# Patient Record
(demographics unavailable — no encounter records)

---

## 2025-01-16 NOTE — PHYSICAL EXAM
[de-identified] : CONSTITUTIONAL: Patient is a very pleasant individual who is well-nourished and appears stated age. PSYCHIATRIC: Alert and oriented times three and in no apparent distress, and participates with orthopedic evaluation well. HEAD: Atraumatic and nonsyndromic in appearance. EENT: No thyromegaly, EOMI. RESPIRATORY: Respiratory rate is regular, not dyspneic on examination. LYMPHATICS: There is no cervical or axillary lymphadenopathy. INTEGUMENTARY: Skin is clean, dry, and intact about the bilateral upper extremities and cervical spine. VASCULAR: There is brisk capillary refill about the bilateral upper extremities and radial pulses are 2/4.  Cervical Spine Exam:  Significant tenderness left-sided paraspinal cervical musculature left posterior scapula There is also shoulder impingement signs of shoulder abduction external rotation Shoulder Abduction (C5): 5/5 B/L Biceps (C6): 5/5 B/L Wrist Extension (C6): 5/5 B/L Triceps (C7): 5/5 B/L Wrist Flexion (C7): 5/5 B/L Finger Adduction/Abduction (C8/T1): 5/5 B/L Sensation:  SILT C5-T1 bilateral  Reflexes: 2+ reflexes Bicep/Tricep/Quadriceps/Achilles  Gait: Normal gait w/o assistance Able to perform tandem gait Able to Heel Walk Able to Toe Walk  Special Testing:  Positive Spurlings recreating neck pain  negative clonus BL LE negative Hoffmans B/L UE   [de-identified] : Upright AP, lateral, and flexion/extension radiographs of the cervical spine performed on 1/16/2025 in the Radiology Department at [facility] for the indication of neck pain are reviewed.  These studies demonstrate maintenance of cervical lordosis there is moderate to severe spondylosis disc height loss at C5-C6 the rest of the disc heights are well-maintained.

## 2025-01-16 NOTE — DISCUSSION/SUMMARY
[de-identified] : 51-year-old female with cervical Spondyloarthritis and left upper extremity radiculopathy likely C6  I discussed with Ashley that I believe her symptoms are related to her cervical Spondyloarthritis which is causing a left upper extremity radiculopathy.  I also discussed the possibility of the be relating to a shoulder pathology possible subacromial bursitis either way I discussed that this should improve with time and conservative measures Medrol Dosepak Gabapentin 300 mg at nighttime Will give her a note to stay off from work tomorrow I will see her back in 2 weeks

## 2025-01-16 NOTE — HISTORY OF PRESENT ILLNESS
[de-identified] : Chief Complaint: Neck and left arm pain   History of Present Illness: 51-year-old female presenting today for evaluation of her severe neck and left arm pain.  States this started about 2 weeks ago came on insidiously Cicely the left side of the neck paraspinal cervical musculature radiating to the left shoulder blade left scapula and sometimes down the lateral arm.  She states that it worsened over the past week she was moving some boxes.  She is never had this before.  She has severe discomfort specially at nighttime while sleeping.  She also has significant radiating pain from the neck and left scapula.  She tried Motrin and Aleve with limited relief of her symptoms.  She denies any numbness or tingling.   Past medical history, past surgical history, medications, allergies, social history, and family history are as documented in our records today.  Notable items include: None   Review of Systems: I have reviewed the patient's documented Review of Systems data today, I concur with this documentation.

## 2025-02-06 NOTE — PHYSICAL EXAM
"REFERRAL REQUESTED BY: Dr. Ramesh Rothman, PCP   LAST SEEN: 10/9/23    Patient ID: Patricia Mckeon is a 56 y.o. female     HPI:   Here for follow up left calf ulcer. Last seen in October. Reports issues with transportation etc. Seems to have more swelling leonel. The tubigrip have not been replaced since last seen. Wound is a lot larger than last seen - reports this is \"looking better\" per the [facility] nurse.     CURRENT DRESSING:  Who is performing the dressing change: [facility]  Dressing applied: left lat distal leg ulcer- triple helix powder, hydrofera blue, mepilex; left lat prox leg ulcer (NEW)- alginate, bordered gauze; left ant leg ulcer- alginate, ABD  Dressing Frequency: Monday/Wednesday/Friday    EDEMA MANAGEMENT:  Compression:  Right: Double Tubigrip CALF 60CM   Left: Double Tubigrip   CALF 90CM     Other Modality  Sleeping in Bed: yes  Elevating FOB:  yes    Offloading/Pressure Relief  Activity Level:  ad bradly  Protection Devices:  N/A    Offloading/Pressure Relief Effective?     ROS:  No fevers or chills  No interval admissions.  [facility] continues to help with the dressing changes and compression    Objective     VITALS:  BP (!) 165/93 (BP Location: Left arm, Patient Position: Sitting)   Pulse 66   Resp 18   Ht 1.575 m (5' 2\")   Wt (!) 176 kg (388 lb 3.2 oz)   BMI 71.00 kg/m²       Physical Exam    ARRIVAL:  Walker  TRANSFER:  None    PSYCHIATRIC:  Oriented to person, place and time: A & O x 3    CONSTITUTIONAL:    Appearance:  Well Groomed and Well Nourished    SKIN:   LDS and scarring, mild eruthema     PULSES:     EXTREMITY TEMPERATURE:    RIGHT: normal  LEFT: normal    EDEMA: severe L>>R    WOUND ASSESSMENT:    Wound 05/23/22 Venous Ulcer Leg Left;Lateral (Active)   Date First Assessed: 05/23/22   Present on Original Admission: Yes  Primary Wound Type: Venous Ulcer  Location: Leg  Wound Location Orientation: (c) Left;Lateral      Assessments 9/13/2023  3:20 PM 1/2/2024 11:05 AM   Wound Image      Site Assessment " Fibrinous --   Cindi-Wound Assessment Intact;Erythematous --   Non-staged Wound Description Full thickness --   Wound Length (cm) 2.1 cm --   Wound Width (cm) 4.2 cm --   Wound Surface Area (cm^2) 8.82 cm^2 --   Wound Depth (cm) 0.4 cm --   Wound Volume (cm^3) 3.528 cm^3 --   Drainage Description Serosanguineous --   Drainage Amount Small --       Inactive Orders   Date Order Priority Status Authorizing Provider   01/02/24 1056 Debridement Routine Completed Adelita Martin MD   10/09/23 1135 Debridement Routine Completed Adelita Martin MD       Wound 05/23/22 Venous Ulcer Leg Left;Anterior;Lateral (Active)   Date First Assessed: 05/23/22   Present on Original Admission: Yes  Primary Wound Type: Venous Ulcer  Location: (c) Leg  Wound Location Orientation: Left;Anterior;Lateral      Assessments 9/13/2023  3:23 PM 1/2/2024 11:04 AM   Wound Image      Site Assessment Granulation --   Cindi-Wound Assessment Erythematous --   Non-staged Wound Description Full thickness --       Inactive Orders   Date Order Priority Status Authorizing Provider   01/02/24 1059 Debridement Routine Completed Adelita Martin MD   10/09/23 1136 Debridement Routine Completed Adelita Martin MD       Wound 01/02/24 Venous Ulcer Leg Left;Proximal;Lateral (Active)   Date First Assessed: 01/02/24   Primary Wound Type: (c) Venous Ulcer  Location: Leg  Wound Location Orientation: Left;Proximal;Lateral      Assessments 1/2/2024 10:05 AM 1/2/2024 11:04 AM   Wound Image       Site Assessment Fibrinous;Granulation;Pink;Red --   Cindi-Wound Assessment Erythematous;Intact --   Non-staged Wound Description Full thickness --   Wound Length (cm) 3 cm --   Wound Width (cm) 4.5 cm --   Wound Surface Area (cm^2) 13.5 cm^2 --   Wound Depth (cm) 0.1 cm --   Wound Volume (cm^3) 1.35 cm^3 --   Drainage Description Serosanguineous --   Drainage Amount Large --   Dressing Status Removed --       Inactive Orders   Date Order Priority Status Authorizing Provider    01/02/24 1101 Debridement Routine Completed Adelita Martin MD       Wound 01/02/24 Venous Ulcer Leg Left;Medial (Active)   Date First Assessed: 01/02/24   Primary Wound Type: Venous Ulcer  Location: Leg  Wound Location Orientation: Left;Medial      Assessments 1/2/2024 11:05 AM   Site Assessment Pink;Red;Fibrinous   Cindi-Wound Assessment Intact   Non-staged Wound Description Full thickness   Wound Length (cm) 2 cm   Wound Width (cm) 1 cm   Wound Surface Area (cm^2) 2 cm^2   Wound Depth (cm) 0.6 cm   Wound Volume (cm^3) 1.2 cm^3   Wound Bed Granulation (%) 90 %   Drainage Description Serosanguineous   Drainage Amount Moderate       No associated orders.       Debridement   Wound 05/23/22 Other (comment) Pretibial Left;Lateral    Consent obtained? verbal  Consent given by: patient  Performed by: resident and physician  Debridement type: surgical  Level of debridement: subcutaneous tissue  Pain control: lidocaine 2%  Post-debridement measurements  Length (cm): 6  Width (cm): 4  Depth (cm): 0.9  Percent debrided: 100%  Surface Area (cm^2): 24  Area debrided (cm^2): 24  Volume (cm^3): 21.6  Tissue and other material debrided: subcutaneous tissue  Devitalized tissue debrided: fibrin  Instrument(s) utilized: nippers and curette  Bleeding: small  Hemostasis obtained with: not applicable  Response to treatment: procedure was tolerated well    Debridement   Wound 05/23/22 Venous Ulcer Leg Left;Anterior;Lateral    Consent obtained? verbal  Consent given by: patient  Performed by: physician and resident  Debridement type: surgical  Level of debridement: subcutaneous tissue  Pain control: lidocaine 2%  Post-debridement measurements  Length (cm): 1  Width (cm): 3.5  Depth (cm): 0.4  Percent debrided: 100%  Surface Area (cm^2): 3.5  Area debrided (cm^2): 3.5  Volume (cm^3): 1.4  Tissue and other material debrided: subcutaneous tissue  Devitalized tissue debrided: fibrin  Instrument(s) utilized: curette  Bleeding:  Normal vision: sees adequately in most situations; can see medication labels, newsprint small  Hemostasis obtained with: not applicable  Response to treatment: procedure was tolerated well    Debridement   Wound 01/02/24 Venous Ulcer Leg Left;Proximal;Lateral    Consent obtained? verbal  Consent given by: patient  Performed by: resident  Debridement type: surgical  Level of debridement: subcutaneous tissue  Pain control: lidocaine 2%  Post-debridement measurements  Length (cm): 3  Width (cm): 5  Depth (cm): 0.2  Percent debrided: 100%  Surface Area (cm^2): 15  Area debrided (cm^2): 15  Volume (cm^3): 3  Tissue and other material debrided: subcutaneous tissue  Devitalized tissue debrided: fibrin  Instrument(s) utilized: curette  Bleeding: small  Hemostasis obtained with: not applicable  Response to treatment: procedure was tolerated well    Debridement    Consent obtained? verbal  Consent given by: patient  Performed by: physician and resident  Debridement type: surgical  Level of debridement: subcutaneous tissue  Pain control: lidocaine 2%  Post-debridement measurements  Length (cm): 2  Width (cm): 1  Depth (cm): 0.6  Percent debrided: 100%  Surface Area (cm^2): 2  Area debrided (cm^2): 2  Volume (cm^3): 1.2  Tissue and other material debrided: subcutaneous tissue  Devitalized tissue debrided: fibrin  Instrument(s) utilized: curette  Bleeding: small  Hemostasis obtained with: not applicable  Response to treatment: procedure was tolerated well        Assessment/Plan   Ulcers larger since last seen. Change dressing as noted. Compression outdated - renew tubigrip. Likely needs better compression. Elevate at night and at rest. Follow up 3 weeks. Continue with [facility].     VISIT ORDERS:  Vascular Study Required: n  Labs Required: n  Radiology Imaging Required: n  Consultation Required: n  Rx Provided:   Changes to Plan of Care: y      NEW ORDERS:  Wash: CHG  Irrigate/Soak:  Skin Care: A&D  Dressing:  triple helix and mepilex change MWF  Compression: double tubigrip leonel  Offloading: keep pressure off the  ulcers    DISCHARGE PLANNING:    Follow Up: 3 week  Nurse Visit: prn    General Instructions:  Center RN to apply EMLA/Lidocaine 1% jelly/LMX  topically to wound(s) prior to debridement by physician.  Center RN my see patient as a nurse consult in my absence.      RN Post Procedure Note:    LLE washed with warm soapy CHG 4%, rinsed, and patted dry. A&D to the intact skin, Triple helix Patch to the base of the wounds covered with a mepilex. Double tubigrip to BLE for compression. Pt educated on the importance of elevating and compression. Pt tolerated procedure well.  ~SALAS Parra RN/ECF/Assisted Living  Agency/Facility: [facility]  days/week: Monday/Wednesday/Friday  Contacted Regarding Changes: yes       [Chaperone Present] : A chaperone was present in the examining room during all aspects of the physical examination [39201] : A chaperone was present during the pelvic exam. [FreeTextEntry2] : Darlin [Appropriately responsive] : appropriately responsive [Alert] : alert [No Acute Distress] : no acute distress [No Lymphadenopathy] : no lymphadenopathy [Regular Rate Rhythm] : regular rate rhythm [No Murmurs] : no murmurs [Clear to Auscultation B/L] : clear to auscultation bilaterally [Soft] : soft [Non-tender] : non-tender [Non-distended] : non-distended [No HSM] : No HSM [No Lesions] : no lesions [No Mass] : no mass [Oriented x3] : oriented x3 [Examination Of The Breasts] : a normal appearance [No Masses] : no breast masses were palpable [Vulvar Atrophy] : vulvar atrophy [Labia Majora] : normal [Labia Minora] : normal [Atrophy] : atrophy [Normal] : normal [Uterine Adnexae] : normal [Declined] : Patient declined rectal exam

## 2025-02-06 NOTE — HISTORY OF PRESENT ILLNESS
[N] : Patient is not sexually active [Y] : Positive pregnancy history [Menarche Age: ____] : age at menarche was [unfilled] [FreeTextEntry1] : 51-year-old -0-0-1 last menstrual cycle was 2 years ago presents with recent postcoital bleeding and occasional hot flashes.  She complains of dyspareunia. [PGxTotal] : 1 [[facility] Center[facility]] : 1 [PGHxPremature] : 0 [PGHxAbortions] : 0 [[facility] Centeriving] : 1 [PGHxABInduced] : 0 [PGHxABSpont] : 0 [PGHxEctopic] : 0 [PGHxMultBirths] : 0

## 2025-02-06 NOTE — HISTORY OF PRESENT ILLNESS
[de-identified] : CC: Neck pain and left arm pain  51-year-old female presenting today for her 3 follow-up visit.  She is having severe neck left upper extremity C6 radiculopathy we tried a course of a Medrol Dosepak anti-inflammatories and gabapentin she states that her symptoms have completely resolved she is happy with her current progress she has limited to no discomfort now she is currently not taking any pain medications.

## 2025-02-06 NOTE — DISCUSSION/SUMMARY
[FreeTextEntry1] : 51-year-old -0-0-1 presents with dyspareunia and postcoital bleeding.  Occasional hot flashes.  Physical examination shows evidence of atrophic changes.  Pap GC chlamydia sent and pelvic sonogram ordered.  Estrogen cream prescribed.  Return in 4 weeks for hysteroscopy endometrial sampling because of the postcoital bleeding.  Patient exercises and eats well.

## 2025-03-10 NOTE — PROCEDURE
[Hysteroscopy] : Hysteroscopy [Time out performed] : Pre-procedure time out performed.  Patient's name, date of birth and procedure confirmed. [Consent Obtained] : Consent obtained [Postmenopausal bleeding] : postmenopausal bleeding [Risks] : risks [Benefits] : benefits [Alternatives] : alternatives [Patient] : patient [Infection] : infection [Bleeding] : bleeding [Allergic Reaction] : allergic reaction [Lidocaine___ mL] : [unfilled] ~UmL of lidocaine [flexible] : Using aseptic technique a hysteroscopy was performed using a flexible hysteroscope [Sent to Pathology] : specimen was placed in buffered formalin and sent for pathology [Hemostasis obtained] : hemostasis obtained [Tolerated Well] : Patient tolerated the procedure well [Aftercare instructions/regstrictions given and follow-up scheduled] : Aftercare instructions/restrictions given and follow-up scheduled [Antibiotics given] : antibiotics not given [de-identified] : Under sterile condition and with paracervical block the cervix was dilated and endometrial sampling obtained and sent to pathology.  Hysteroscopic evaluation shows atrophic changes.  No polyps or myomas noted.  Patient tolerated the procedure well.

## 2025-03-11 NOTE — END OF VISIT
[Time Spent: ___ minutes] : I have spent [unfilled] minutes of time on the encounter which excludes teaching and separately reported services. [TextEntry] : Discussed with pt at length regarding Prior Covid 19 infection, soboe, weight issues, ? asthma; reviewed prior w/u with pt as above.

## 2025-03-11 NOTE — CONSULT LETTER
[Dear  ___] : Dear  [unfilled], [Consult Letter:] : I had the pleasure of evaluating your patient, [unfilled]. [Please see my note below.] : Please see my note below. [Consult Closing:] : Thank you very much for allowing me to participate in the care of this patient.  If you have any questions, please do not hesitate to contact me. [Sincerely,] : Sincerely, [FreeTextEntry3] : Matthew Lopez MD, FCCP, D. ABSM\par  Pulmonary and Sleep Medicine\par  [facility] at [place]

## 2025-03-11 NOTE — RESULTS/DATA
[TextEntry] : CXR from 12/5/19 was clear by report. Covid Ab + from 8/20/20. A1AT level from 9/19/20 done by GI was normal at 120. CXR from 11/6/20 was clear. Home PSG from 12/5/20 revealed no ANGELINA with an AHI of 3.6. PSG from 1/23/21 revealed no ANGELINA with an AHI of 4.1.

## 2025-03-11 NOTE — DISCUSSION/SUMMARY
[FreeTextEntry1] : #1. PFTs performed previously were essentially normal; repeat again normal; repeat in winter when her symptoms are typically worse were still normal though slightly reduced from previous; repeat ari again normal despite URI symptoms. #2. The patient does not appear to require chronic BD therapy at this time; continue to observe off of Advair/Dulera which she only uses when symptoms worsen otherwise with occasional Albuterol use. #3. SOBOE is likely related to weight or deconditioning given normal PFTs. #4. Diet and exercise for weight loss. #5. Both HST and in-lab PSG were negative for ANGELINA with an in-lab AHI of 4.1 #6. Pt could f/u as needed as PFTs were normal and sleep w/u was without ANGELINA; pt would like to continue to monitor her intermittent asthma so she will return at least annually with lung function testing. #9. Pt had both Covid vaccines and booster. #10. Medrol dose renetta for URI symptoms and can use as needed. Sometimes requires abx as well. #11. F/u in 6 months with ari but sooner if needed.  The patient expressed understanding and agreement with the above recommendations/plan and accepts responsibility to be compliant with recommended testing, therapies, and f/u visits. All relevant questions and concerns were addressed.

## 2025-03-11 NOTE — HISTORY OF PRESENT ILLNESS
[Excessive Daytime Sleepiness] : excessive daytime sleepiness [Snoring] : snoring [Unrefreshing Sleep] : unrefreshing sleep [Sleepy When Sedentary] : sleepy when sedentary [Excess Weight] : excess weight [Currently Experiencing] : The patient is currently experiencing symptoms. [Dyspnea] : dyspnea [Back Pain] : back pain [Low Calorie Diet] : low calorie diet [Fair Compliance] : fair compliance with treatment [Fair Tolerance] : fair tolerance of treatment [Poor Symptom Control] : poor symptom control [Dyslipidemia] : dyslipidemia [High] : high [Low Calorie] : low calorie [Well Balanced Diet] : well balanced meals [Follow-Up - Routine Clinic] : a routine clinic follow-up of [Intermittent] : intermittent [None] : No associated symptoms are reported [Inhaled Short-Acting Beta-2 Agonists] : inhaled short-acting beta-2 agonists [Good Compliance] : good compliance with treatment [Good Tolerance] : good tolerance of treatment [Good Symptom Control] : good symptom control [TextBox_4] : Former social smoker x 7 years, quit 2009. Pt went to [facility] for asthma exac in 12/5/20 and was started on Advair inhaler but now is only on Albuterol as needed. She reports that her symptoms are back to baseline. Pt was dx'd with Covid 19 infection in April 2020 and subsequent Covid Ab test was +. She presented with "flu like" symptoms, loss of smell, sweats, fevers, weakness/fatigue. She denies any therapy at that time and recovered after 2 weeks. She is no longer on Advair and reports only occasional prn Albuterol use. Pt with frequent URIs but now at baseline and only on Albuterol as needed which she is rarely using. She uses abx and Medrol dose renetta on occasion but usually does not require chronic therapy. Pt trying to lose weight. Essentially at baseline. [Witnessed Apnea During Sleep] : no witnessed apnea during sleep [Witnessed Gasping During Sleep] : no witnessed gasping during sleep [Knee Pain] : no knee pain [Joint Pain] : no joint pain

## 2025-03-11 NOTE — PROCEDURE
[FreeTextEntry1] : PFTs 2/27/20 - essentially normal. PFTs 1/5/21 - normal. PFTs 3/8/22 - normal and improved from previous. PFTs 1/27/23 - normal though slightly worse from previous though spirometry still > 100%. Leming 9/23/24 - normal and at baseline.

## 2025-03-19 NOTE — HISTORY OF PRESENT ILLNESS
[FreeTextEntry1] : Taking over care for Dr. Jones and Dr. Varghese due to patient preference. Distance to Dr. Varghese office and dr. jones has left [facility] practice. [facility] preventive cardiology  50 year old female with a past medical history of Asthma, hypothyroid, elevated transaminase level, VERY SIGNIFICANT FHx Early MI, NAFLD, hypertriglyceridemia, hypercholesterolemia, statin intolerance   9/2021 Labs on 9/2021 demonstrated: Total Cholesterol 287mg/dL HDL 39mg/dL  LDL 195mg/dL Triglycerides 270mg/dL   Father - MI x2 around 60 Brother passed of stroke at 40 both parents and sister have high chol Crestor - stopped due to increases liver enzyme about 1 month ago,  Was also rechallenged on crestor (3x per week) due to myalgias - but myalgias continued  exercise: hurt knee and unable to exercise at the moment - does walk, dance and ride bike when she is feeling healthy Lost 20lbs  Diet: does not eat carbs, consumes a lot of veggies, fruits, chicken and fish    Doing much better on PCSK9i TG nl 102 and LDL &&   Worried about glucose going upwith Repatha But A1c not actually going up. Watching it.   Having migraines. Suggested holding off on Repatha for 6 weeks  If still there when re-starting, then go to bempedoic acid   6-2023 Doing well - back on Repatha  Excellent result   Repatha doing well A1c climbing  Referred to nutritionist   New CP - sending for CTA Cors  CTA - Cors -  7-2024 - AU -0  TTE --  - LVEF  Nl Maybe vasospasm - but BP low  9/27/24: Taking over for Dr. Varghese for patient preference given location. Used to follow with dr. jones who has left the practice. Denies chest pain/pressure, palpitations, irregular and/or rapid heart beat, SOB, REYES, syncope/near syncope, dizziness, orthopnea, PND, cough, edema, f/c, n/v/d, hematuria, or hematochezia. no concerns, feels well.  03/2025: returns for follow up. LDL 59,  reports she will start working out more. refill of repatha requested. hba1c 5.9%

## 2025-03-19 NOTE — ASSESSMENT
[FreeTextEntry1] : Taking over care for Dr. Jones and Dr. Varghese due to patient preference. Distance to Dr. Varghese office and dr. jones has left [facility] practice. [facility] cardiology  50 year old female with a past medical history of Asthma, hypothyroid, elevated transaminase level, VERY SIGNIFICANT FHx Early MI, NAFLD, hypertriglyceridemia, hypercholesterolemia, statin intolerance

## 2025-03-19 NOTE — DISCUSSION/SUMMARY
[FreeTextEntry1] : Labs on 9/2021 demonstrated: Total Cholesterol 287mg/dL HDL 39mg/dL LDL 195mg/dL Triglycerides 270mg/dL   VERY SIGNIFICANT FHx Early MI, NAFLD, hypertriglyceridemia, hypercholesterolemia, statin intolerance  Father - MI x2 around 60, Elevated transaminase level with statins  CTA - Cardiac 7-2024. CAC 0 no epicardial disease obstructive TTE  - LVEF within normal limits Type IIb Hyperlipdemia  Plan: - PCSK9i continue (Monitor glucose) - Will f/u in 6 months  LDL 03/2025: LDL 56, ,  - c/w pcsk9i, start omega 3's, no fatty/high carb/fat foods, avoid alcohol, increase exercise. - repeat hba1c in 3 months May-june 2025. If continues to elevate despite activity and weight loss will consider change to Leqvio.  The patient is aware of alarm cardiac type symptoms, will call with questions or concerns and is aware to activate 911 and/or present to the closest emergency department if concerns.  As with all my patients, I would strongly pursue preventative cardiology, lifestyle modifications which are necessary for long-term cardiovascular health.  The following is recommended: Advised a plant based, limited salt, low fat, minimal dairy diet, stress reduction/psychosomatic management, sleep hygiene/ANGELINA testing and healthy weight loss, annual influenza vaccine, medication compliance, high risk behavior mitigation (I.e. tobacco abstinence, alcohol abstinence, recreational/illicit drug use abstinence), increased exercise activity as per AHA/ACC standard for long term cardiovascular risk reduction.

## 2025-03-25 NOTE — HISTORY OF PRESENT ILLNESS
[FreeTextEntry1] : DAIVD CAMPA is a 51 year old female with a PMH significant for fatty liver, HLD, Asthma, and hypothyroid.  March 25, 2025 Patient presents for 1 year follow up of elevated LFTs and fatty liver  No new liver related symptoms reported Abd US 2/22/2025 fatty liver. No HCC AST 27 ALT 28 Alk Phos 93  No recent infections or hospitalizations  No new medications   March 19, 2024 Records reviewed, including notes, labs, imaging, etc. No new liver-related complaints like hematemesis melena jaundice. No hospitalizations or medication changes. Previsit labs reviewed with the patient ALT still mildly elevated.  She has not been able to lose weight but has put on more weight. Counseled about the need to lose weight   9/19/23: Pt presents today for evaluation of recent rash that started in August. Pt developed rash spontaneously in August and presented to the ER. She was given benadryl and prednisone and then followed up w/allergist. Pt takes multiple otc and herbal supplements which she was advised to stop and slowly re-add to her regimen while evaluating for a reaction.   3/1/23: She presents today for follow up of fatty liver. Since last visit, pt reports she is doing well. She reports she has lost weight over the summer but gained the weight back during the winter time due to lack of exercise. Results of labs and ultrasound reviewed with pt. Labs done 2/25/23 - bilirubin 0.6, AST 37, ALT 41, AP 66, , INR 0.97. Abdominal ultrasound showed hepatic steatosis.  Denies fatigue, malaise, arthralgias, myalgias, pruritus, recent infection, abdominal pain or distension, jaundice, hematemesis, hematochezia, dark urine, confusion, unintentional weight loss or gain.  4/27/22: Very pleasant 48-year-old female patient with metabolic syndrome, hypertension hyperlipidemia increased waist circumference abnormal blood sugars presents for evaluation of her elevated LFTs and fatty liver seen on imaging.  On chart review her transaminase elevation goes all the way to 2016. There are no labs prior to that in the system.  The LFT elevation is mostly in the form of transaminases AST and ALT bilirubin and alkaline phosphate have always been normal. Patient has no risk factors for viral hepatitis, no history of significant alcohol intake. She gives a family history of liver disease in her mother. History of cardiac disease in the family. History of young death in her brother at the age of 40 from a CVA. Patient states he was found to have a hole in the heart". No history of liver disease in her children. Patient does have some issues with depression, mood lability, irritability, impulsivity, decreased attention and emotional lability. She states her mother has similar symptoms and in addition has recently developed some movement disorders. Her mother resides in [place]. She had an extensive work-up for her elevated liver tests and they were mostly found to be negative. Negative for viral hepatitis, alpha-1 antitrypsin deficiency, hemochromatosis, autoimmunity. Her ceruloplasmin is noted to be low at 19.

## 2025-03-25 NOTE — PHYSICAL EXAM
[Non-Tender] : non-tender [Smooth] : smooth [General Appearance - Alert] : alert [General Appearance - In No Acute Distress] : in no acute distress [Sclera] : the sclera and conjunctiva were normal [Outer Ear] : the ears and nose were normal in appearance [Oropharynx] : the oropharynx was normal [Neck Appearance] : the appearance of the neck was normal [Edema] : there was no peripheral edema [Bowel Sounds] : normal bowel sounds [Abdomen Soft] : soft [Abdomen Tenderness] : non-tender [Abdomen Mass (___ Cm)] : no abdominal mass palpated [Abnormal Walk] : normal gait [Nail Clubbing] : no clubbing  or cyanosis of the fingernails [Musculoskeletal - Swelling] : no joint swelling seen [Motor Tone] : muscle strength and tone were normal [Skin Color & Pigmentation] : normal skin color and pigmentation [Skin Turgor] : normal skin turgor [] : no rash [No Focal Deficits] : no focal deficits [Oriented To Time, Place, And Person] : oriented to person, place, and time [Impaired Insight] : insight and judgment were intact [Affect] : the affect was normal [Scleral Icterus] : No Scleral Icterus [Spider Angioma] : No spider angioma(s) were observed [Abdominal  Ascites] : no ascites [Splenomegaly] : no splenomegaly [Asterixis] : no asterixis observed [Jaundice] : No jaundice [Depression] : no depression [Hallucinations] : ~T no ~M hallucinations

## 2025-03-25 NOTE — ASSESSMENT
[FreeTextEntry1] : DAVID CAMPA is a 51 year old female with a PMH significant for fatty liver, HLD, Asthma, and hypothyroid.  Nonalcoholic Fatty Liver Disease. -Etiology of fatty liver disease explained to pt in detail along with complications of disease progression. Recommend lifestyle modifications in form of diet and exercise. Gradual weight loss of 7-10% of current body weight. These changes have been shown to lead to regression or even resolution of steatosis, inflammation, and even fibrosis in some patients. Copy of Mediterranean diet given. -If LFTs remain elevated despite lifestyle modifications, will consider Liver Biopsy. -US abdo 2025 hepatic steatosis. No HCC -Patient has not lost weight but is counseled on weight loss through diet and exercise   Liver fibrosis FibroScan ordered  Discussed the meaning of advanced fibrosis with the patient. I reviewed the natural history of the disease. I explained the risks for the development of cirrhosis and complication related to cirrhosis.  Follow up in 1 year or with PCP   I, Saba Cuellar NP, acted as scribe for EMMA Smith for this patient encounter.

## 2025-04-07 NOTE — HISTORY OF PRESENT ILLNESS
[FreeTextEntry1] : 51-year-old patient status post endometrial sampling hysteroscopy pathology came back showing small polypoid basal inactive endometrium.  Patient denies recent postmenopausal bleeding.